# Patient Record
Sex: MALE | URBAN - METROPOLITAN AREA
[De-identification: names, ages, dates, MRNs, and addresses within clinical notes are randomized per-mention and may not be internally consistent; named-entity substitution may affect disease eponyms.]

---

## 2024-09-02 ENCOUNTER — HOSPITAL ENCOUNTER (EMERGENCY)
Facility: HOSPITAL | Age: 39
Discharge: HOME | End: 2024-09-03
Attending: EMERGENCY MEDICINE

## 2024-09-02 ENCOUNTER — APPOINTMENT (EMERGENCY)
Dept: CT IMAGING | Facility: HOSPITAL | Age: 39
End: 2024-09-02

## 2024-09-02 ENCOUNTER — APPOINTMENT (EMERGENCY)
Dept: RADIOLOGY | Facility: HOSPITAL | Age: 39
End: 2024-09-02

## 2024-09-02 VITALS
RESPIRATION RATE: 20 BRPM | SYSTOLIC BLOOD PRESSURE: 159 MMHG | OXYGEN SATURATION: 98 % | DIASTOLIC BLOOD PRESSURE: 96 MMHG | WEIGHT: 235.9 LBS | TEMPERATURE: 98.5 F | HEART RATE: 86 BPM

## 2024-09-02 DIAGNOSIS — M54.2 NECK PAIN: ICD-10-CM

## 2024-09-02 DIAGNOSIS — L03.90 CELLULITIS: ICD-10-CM

## 2024-09-02 DIAGNOSIS — R07.9 CHEST PAIN: Primary | ICD-10-CM

## 2024-09-02 LAB
ALBUMIN SERPL BCG-MCNC: 4.5 G/DL (ref 3.5–5)
ALP SERPL-CCNC: 88 U/L (ref 34–104)
ALT SERPL W P-5'-P-CCNC: 55 U/L (ref 7–52)
ANION GAP SERPL CALCULATED.3IONS-SCNC: 8 MMOL/L (ref 4–13)
AST SERPL W P-5'-P-CCNC: 30 U/L (ref 13–39)
BASOPHILS # BLD AUTO: 0.04 THOUSANDS/ÂΜL (ref 0–0.1)
BASOPHILS NFR BLD AUTO: 1 % (ref 0–1)
BILIRUB SERPL-MCNC: 0.5 MG/DL (ref 0.2–1)
BNP SERPL-MCNC: 27 PG/ML (ref 0–100)
BUN SERPL-MCNC: 14 MG/DL (ref 5–25)
CALCIUM SERPL-MCNC: 9.3 MG/DL (ref 8.4–10.2)
CARDIAC TROPONIN I PNL SERPL HS: <2 NG/L
CHLORIDE SERPL-SCNC: 98 MMOL/L (ref 96–108)
CO2 SERPL-SCNC: 29 MMOL/L (ref 21–32)
CREAT SERPL-MCNC: 1.05 MG/DL (ref 0.6–1.3)
EOSINOPHIL # BLD AUTO: 0.12 THOUSAND/ÂΜL (ref 0–0.61)
EOSINOPHIL NFR BLD AUTO: 1 % (ref 0–6)
ERYTHROCYTE [DISTWIDTH] IN BLOOD BY AUTOMATED COUNT: 12.4 % (ref 11.6–15.1)
GFR SERPL CREATININE-BSD FRML MDRD: 88 ML/MIN/1.73SQ M
GLUCOSE SERPL-MCNC: 116 MG/DL (ref 65–140)
HCT VFR BLD AUTO: 42.9 % (ref 36.5–49.3)
HGB BLD-MCNC: 14.5 G/DL (ref 12–17)
IMM GRANULOCYTES # BLD AUTO: 0.06 THOUSAND/UL (ref 0–0.2)
IMM GRANULOCYTES NFR BLD AUTO: 1 % (ref 0–2)
LYMPHOCYTES # BLD AUTO: 2.85 THOUSANDS/ÂΜL (ref 0.6–4.47)
LYMPHOCYTES NFR BLD AUTO: 33 % (ref 14–44)
MCH RBC QN AUTO: 31.5 PG (ref 26.8–34.3)
MCHC RBC AUTO-ENTMCNC: 33.8 G/DL (ref 31.4–37.4)
MCV RBC AUTO: 93 FL (ref 82–98)
MONOCYTES # BLD AUTO: 1.01 THOUSAND/ÂΜL (ref 0.17–1.22)
MONOCYTES NFR BLD AUTO: 12 % (ref 4–12)
NEUTROPHILS # BLD AUTO: 4.47 THOUSANDS/ÂΜL (ref 1.85–7.62)
NEUTS SEG NFR BLD AUTO: 52 % (ref 43–75)
NRBC BLD AUTO-RTO: 0 /100 WBCS
PLATELET # BLD AUTO: 365 THOUSANDS/UL (ref 149–390)
PMV BLD AUTO: 8.9 FL (ref 8.9–12.7)
POTASSIUM SERPL-SCNC: 3.8 MMOL/L (ref 3.5–5.3)
PROT SERPL-MCNC: 8.1 G/DL (ref 6.4–8.4)
RBC # BLD AUTO: 4.61 MILLION/UL (ref 3.88–5.62)
SODIUM SERPL-SCNC: 135 MMOL/L (ref 135–147)
WBC # BLD AUTO: 8.55 THOUSAND/UL (ref 4.31–10.16)

## 2024-09-02 PROCEDURE — 85025 COMPLETE CBC W/AUTO DIFF WBC: CPT | Performed by: EMERGENCY MEDICINE

## 2024-09-02 PROCEDURE — 70498 CT ANGIOGRAPHY NECK: CPT

## 2024-09-02 PROCEDURE — 71045 X-RAY EXAM CHEST 1 VIEW: CPT

## 2024-09-02 PROCEDURE — 80053 COMPREHEN METABOLIC PANEL: CPT | Performed by: EMERGENCY MEDICINE

## 2024-09-02 PROCEDURE — 36415 COLL VENOUS BLD VENIPUNCTURE: CPT | Performed by: EMERGENCY MEDICINE

## 2024-09-02 PROCEDURE — 93005 ELECTROCARDIOGRAM TRACING: CPT

## 2024-09-02 PROCEDURE — 96365 THER/PROPH/DIAG IV INF INIT: CPT

## 2024-09-02 PROCEDURE — 84484 ASSAY OF TROPONIN QUANT: CPT | Performed by: EMERGENCY MEDICINE

## 2024-09-02 PROCEDURE — 99285 EMERGENCY DEPT VISIT HI MDM: CPT | Performed by: EMERGENCY MEDICINE

## 2024-09-02 PROCEDURE — 96375 TX/PRO/DX INJ NEW DRUG ADDON: CPT

## 2024-09-02 PROCEDURE — 70496 CT ANGIOGRAPHY HEAD: CPT

## 2024-09-02 PROCEDURE — 96361 HYDRATE IV INFUSION ADD-ON: CPT

## 2024-09-02 PROCEDURE — 99285 EMERGENCY DEPT VISIT HI MDM: CPT

## 2024-09-02 PROCEDURE — 83880 ASSAY OF NATRIURETIC PEPTIDE: CPT | Performed by: EMERGENCY MEDICINE

## 2024-09-02 RX ORDER — ACETAMINOPHEN 10 MG/ML
1000 INJECTION, SOLUTION INTRAVENOUS ONCE
Status: COMPLETED | OUTPATIENT
Start: 2024-09-02 | End: 2024-09-02

## 2024-09-02 RX ORDER — KETOROLAC TROMETHAMINE 30 MG/ML
15 INJECTION, SOLUTION INTRAMUSCULAR; INTRAVENOUS ONCE
Status: COMPLETED | OUTPATIENT
Start: 2024-09-02 | End: 2024-09-02

## 2024-09-02 RX ADMIN — SODIUM CHLORIDE 1000 ML: 0.9 INJECTION, SOLUTION INTRAVENOUS at 21:42

## 2024-09-02 RX ADMIN — IOHEXOL 100 ML: 350 INJECTION, SOLUTION INTRAVENOUS at 23:50

## 2024-09-02 RX ADMIN — KETOROLAC TROMETHAMINE 15 MG: 30 INJECTION, SOLUTION INTRAMUSCULAR; INTRAVENOUS at 21:42

## 2024-09-02 RX ADMIN — ACETAMINOPHEN 1000 MG: 10 INJECTION INTRAVENOUS at 21:42

## 2024-09-02 NOTE — Clinical Note
Phil Caban was seen and treated in our emergency department on 9/2/2024.                Diagnosis:     Phil  .    He may return on this date: 09/06/2024         If you have any questions or concerns, please don't hesitate to call.      Kishore Johnson, DO    ______________________________           _______________          _______________  Hospital Representative                              Date                                Time

## 2024-09-03 LAB
ATRIAL RATE: 86 BPM
P AXIS: 45 DEGREES
PR INTERVAL: 170 MS
QRS AXIS: 37 DEGREES
QRSD INTERVAL: 92 MS
QT INTERVAL: 348 MS
QTC INTERVAL: 416 MS
T WAVE AXIS: 31 DEGREES
VENTRICULAR RATE: 86 BPM

## 2024-09-03 PROCEDURE — 93010 ELECTROCARDIOGRAM REPORT: CPT | Performed by: INTERNAL MEDICINE

## 2024-09-03 PROCEDURE — 96361 HYDRATE IV INFUSION ADD-ON: CPT

## 2024-09-03 RX ORDER — CEPHALEXIN 500 MG/1
500 CAPSULE ORAL EVERY 12 HOURS SCHEDULED
Qty: 14 CAPSULE | Refills: 0 | Status: SHIPPED | OUTPATIENT
Start: 2024-09-03 | End: 2024-09-10

## 2024-09-03 NOTE — ED PROVIDER NOTES
History  Chief Complaint   Patient presents with    Chest Pain     Pt c/o ongoing L sided chest pain x 2 weeks ago; pt also c/o L sided neck pain possible enlarged lymph node     9-year-old male, English-speaking, offered  service and prefers to use family.  Patient recently emigrated from [facility].  Has no PCP or medical care country yet.  Does note he has a history of a dilated cardiomyopathy, although cannot provide any additional insight into medical conditions.  Patient has had 2 weeks of left-sided chest pain, nonradiating, generalized bodyaches.  Also complaining of left-sided neck swelling and pain with neck movement.  He states he feels like the pain goes up into the left side of his head.  No vision changes no focal weakness numbness or tingling anywhere in his body, no history of stroke, CVA or thromboembolism that the patient is aware of.      Chest Pain  Associated symptoms: no abdominal pain, no cough, no dizziness, no dysphagia, no fever, no nausea, no numbness, no palpitations, no shortness of breath, not vomiting and no weakness        None       Past Medical History:   Diagnosis Date    Tachycardia        No past surgical history on file.    No family history on file.  I have reviewed and agree with the history as documented.    E-Cigarette/Vaping     E-Cigarette/Vaping Substances          Review of Systems   Constitutional: Negative.  Negative for chills and fever.   HENT: Negative.  Negative for rhinorrhea, sore throat, trouble swallowing and voice change.    Eyes: Negative.  Negative for pain and visual disturbance.   Respiratory: Negative.  Negative for cough, shortness of breath and wheezing.    Cardiovascular:  Positive for chest pain. Negative for palpitations.   Gastrointestinal:  Negative for abdominal pain, diarrhea, nausea and vomiting.   Genitourinary: Negative.  Negative for dysuria and frequency.   Musculoskeletal:  Positive for arthralgias and neck pain. Negative for neck  stiffness.   Skin: Negative.  Negative for rash.   Neurological: Negative.  Negative for dizziness, speech difficulty, weakness, light-headedness and numbness.       Physical Exam  Physical Exam  Vitals and nursing note reviewed.   Constitutional:       General: He is not in acute distress.     Appearance: He is well-developed.   HENT:      Head: Normocephalic and atraumatic.   Eyes:      Conjunctiva/sclera: Conjunctivae normal.      Pupils: Pupils are equal, round, and reactive to light.   Neck:      Trachea: No tracheal deviation.     Cardiovascular:      Rate and Rhythm: Normal rate and regular rhythm.   Pulmonary:      Effort: Pulmonary effort is normal. No respiratory distress.      Breath sounds: Normal breath sounds. No wheezing or rales.   Abdominal:      General: Bowel sounds are normal. There is no distension.      Palpations: Abdomen is soft.      Tenderness: There is no abdominal tenderness. There is no guarding or rebound.   Musculoskeletal:         General: No tenderness or deformity. Normal range of motion.      Cervical back: Normal range of motion and neck supple.   Skin:     General: Skin is warm and dry.      Capillary Refill: Capillary refill takes less than 2 seconds.      Findings: No rash.   Neurological:      Mental Status: He is alert and oriented to person, place, and time.   Psychiatric:         Behavior: Behavior normal.         Vital Signs  ED Triage Vitals   Temperature Pulse Respirations Blood Pressure SpO2   09/02/24 2111 09/02/24 2108 09/02/24 2108 09/02/24 2111 09/02/24 2111   98.5 °F (36.9 °C) 86 20 159/96 98 %      Temp Source Heart Rate Source Patient Position - Orthostatic VS BP Location FiO2 (%)   09/02/24 2111 -- 09/02/24 2111 09/02/24 2111 --   Oral  Sitting Right arm       Pain Score       09/02/24 2142       6           Vitals:    09/02/24 2108 09/02/24 2111   BP:  159/96   Pulse: 86    Patient Position - Orthostatic VS:  Sitting         Visual Acuity      ED  Medications  Medications   sodium chloride 0.9 % bolus 1,000 mL (0 mL Intravenous Stopped 9/3/24 0058)   ketorolac (TORADOL) injection 15 mg (15 mg Intravenous Given 9/2/24 2142)   acetaminophen (Ofirmev) injection 1,000 mg (0 mg Intravenous Stopped 9/2/24 2219)   iohexol (OMNIPAQUE) 350 MG/ML injection (SINGLE-DOSE) 100 mL (100 mL Intravenous Given 9/2/24 2350)       Diagnostic Studies  Results Reviewed       Procedure Component Value Units Date/Time    HS Troponin 0hr (reflex protocol) [[id number]]  (Normal) Collected: 09/02/24 2142    Lab Status: Final result Specimen: Blood from Arm, Left Updated: 09/02/24 2212     hs TnI 0hr <2 ng/L     B-Type Natriuretic Peptide(BNP) [[id number]]  (Normal) Collected: 09/02/24 2142    Lab Status: Final result Specimen: Blood from Arm, Left Updated: 09/02/24 2210     BNP 27 pg/mL     Comprehensive metabolic panel [[id number]]  (Abnormal) Collected: 09/02/24 2142    Lab Status: Final result Specimen: Blood from Arm, Left Updated: 09/02/24 2206     Sodium 135 mmol/L      Potassium 3.8 mmol/L      Chloride 98 mmol/L      CO2 29 mmol/L      ANION GAP 8 mmol/L      BUN 14 mg/dL      Creatinine 1.05 mg/dL      Glucose 116 mg/dL      Calcium 9.3 mg/dL      AST 30 U/L      ALT 55 U/L      Alkaline Phosphatase 88 U/L      Total Protein 8.1 g/dL      Albumin 4.5 g/dL      Total Bilirubin 0.50 mg/dL      eGFR 88 ml/min/1.73sq m     Narrative:      National Kidney Disease Foundation guidelines for Chronic Kidney Disease (CKD):     Stage 1 with normal or high GFR (GFR > 90 mL/min/1.73 square meters)    Stage 2 Mild CKD (GFR = 60-89 mL/min/1.73 square meters)    Stage 3A Moderate CKD (GFR = 45-59 mL/min/1.73 square meters)    Stage 3B Moderate CKD (GFR = 30-44 mL/min/1.73 square meters)    Stage 4 Severe CKD (GFR = 15-29 mL/min/1.73 square meters)    Stage 5 End Stage CKD (GFR <15 mL/min/1.73 square meters)  Note: GFR calculation is accurate only with a steady state creatinine    CBC and  differential [[id number]] Collected: 09/02/24 2142    Lab Status: Final result Specimen: Blood from Arm, Left Updated: 09/02/24 2147     WBC 8.55 Thousand/uL      RBC 4.61 Million/uL      Hemoglobin 14.5 g/dL      Hematocrit 42.9 %      MCV 93 fL      MCH 31.5 pg      MCHC 33.8 g/dL      RDW 12.4 %      MPV 8.9 fL      Platelets 365 Thousands/uL      nRBC 0 /100 WBCs      Segmented % 52 %      Immature Grans % 1 %      Lymphocytes % 33 %      Monocytes % 12 %      Eosinophils Relative 1 %      Basophils Relative 1 %      Absolute Neutrophils 4.47 Thousands/µL      Absolute Immature Grans 0.06 Thousand/uL      Absolute Lymphocytes 2.85 Thousands/µL      Absolute Monocytes 1.01 Thousand/µL      Eosinophils Absolute 0.12 Thousand/µL      Basophils Absolute 0.04 Thousands/µL                    CTA head and neck with and without contrast   Final Result by Herb Calderon DO (09/03 0045)      CT Brain:  No acute intracranial abnormality.      CT Angiography:  Unremarkable CTA neck and brain.                  Workstation performed: [id number]         XR chest 1 view portable   ED Interpretation by Kishore Johnson DO (09/02 2207)   No acute cardiopulmonary disease appreciated.      Final Result by Crystal Allen MD (09/03 0829)      No acute cardiopulmonary disease.            Workstation performed: [id number]                    Procedures  Procedures         ED Course  ED Course as of 09/03/24 1050   Mon Sep 02, 2024   2111 Procedure Note: EKG  Date/Time: 09/02/24 9:11 PM   Performed by: Abdelrahman Johnson  Authorized by: Abdelrahman Johnson  ECG interpreted by me, the ED Provider: yes   The EKG demonstrates:  Rate 86  Rhythm sinus  QTc 416  No ST elevations/depressions       2206 Hemoglobin: 14.5   2206 WBC: 8.55   2235 hs TnI 0hr: <2   2236 Chest pain present for 2 weeks. Trop undetectable. No inidcation for continues serial troponins.                HEART Risk Score      Flowsheet Row Most Recent Value   Heart Score Risk  Calculator    History 1 Filed at: 09/02/2024 2238   ECG 0 Filed at: 09/02/2024 2238   Age 0 Filed at: 09/02/2024 2238   Risk Factors 0 Filed at: 09/02/2024 2238   Troponin 0 Filed at: 09/02/2024 2238   HEART Score 1 Filed at: 09/02/2024 2238                                        Medical Decision Making  39-year-old male, presenting for concerns of neck pain and chest pain.  Chest pain is present for 2 weeks, EKG is normal sinus rhythm without evidence of ischemia per my interpretation.  He is also had a negative troponin test.  At this point acute myocardial infarction and ACS essentially has been ruled out.  Patient's blood work is otherwise unremarkable.  I did review his CT angiogram of his head and his neck.  No large extravasation or large mass appreciated.  Care is being transferred to Dr. Carol Rivas pending a read for his CTs.  If normal patient can be discharged with outpatient follow-up.    Amount and/or Complexity of Data Reviewed  Labs: ordered. Decision-making details documented in ED Course.  Radiology: ordered and independent interpretation performed.    Risk  Prescription drug management.                 Disposition  Final diagnoses:   Chest pain   Neck pain   Cellulitis     Time reflects when diagnosis was documented in both MDM as applicable and the Disposition within this note       Time User Action Codes Description Comment    9/3/2024 12:07 AM Kishore Johnson [R07.9] Chest pain     9/3/2024 12:07 AM Kishore Johnson [M54.2] Neck pain     9/3/2024 12:08 AM Kishore Johnson [L03.90] Cellulitis           ED Disposition       ED Disposition   Discharge    Condition   Stable    Date/Time   Tue Sep 3, 2024 0049    Comment   Phil Caban discharge to home/self care.                   Follow-up Information       Follow up With Specialties Details Why Contact Info Additional Information    [facility] In 1 week  [address],  [address]  [facility] [address] [phone] [facility], [address] [place] [address], [address], [place], [place], [address] [phone]            Discharge Medication List as of 9/3/2024 12:49 AM        START taking these medications    Details   cephalexin (KEFLEX) 500 mg capsule Take 1 capsule (500 mg total) by mouth every 12 (twelve) hours for 7 days, Starting Tue 9/3/2024, Until Tue 9/10/2024, Normal             No discharge procedures on file.    PDMP Review       None            ED Provider  Electronically Signed by             Kishore Johnson DO  09/03/24 8460

## 2024-09-03 NOTE — ED CARE HANDOFF
Emergency Department Sign Out Note        Sign out and transfer of care from Alex. See Separate Emergency Department note.     The patient, Phil Caban, was evaluated by the previous provider for see prior note.      ED Course / Workup Pending (followup):        HEART Risk Score      Flowsheet Row Most Recent Value   Heart Score Risk Calculator    History 1 Filed at: 09/02/2024 2238   ECG 0 Filed at: 09/02/2024 2238   Age 0 Filed at: 09/02/2024 2238   Risk Factors 0 Filed at: 09/02/2024 2238   Troponin 0 Filed at: 09/02/2024 2238   HEART Score 1 Filed at: 09/02/2024 2238                                    ED Course as of 09/03/24 0050   Tue Sep 03, 2024   0006 S/o: came in with chest pain, EKG nsr, trop negative, left neck redness. Pending CTA, if negative, outpt abx and pcp follow up.   0050      CT Brain:  No acute intracranial abnormality.     CT Angiography:  Unremarkable CTA neck and brain.          Procedures  Medical Decision Making  Amount and/or Complexity of Data Reviewed  Labs: ordered.  Radiology: ordered and independent interpretation performed.    Risk  Prescription drug management.            Disposition  Final diagnoses:   Chest pain   Neck pain   Cellulitis     Time reflects when diagnosis was documented in both MDM as applicable and the Disposition within this note       Time User Action Codes Description Comment    9/3/2024 12:07 AM Kishore Johnson [R07.9] Chest pain     9/3/2024 12:07 AM Kishore Johnson [M54.2] Neck pain     9/3/2024 12:08 AM Kishore Johnson [L03.90] Cellulitis           ED Disposition       ED Disposition   Discharge    Condition   Stable    Date/Time   Tue Sep 3, 2024 0049    Comment   Phil Caban discharge to home/self care.                   Follow-up Information       Follow up With Specialties Details Why Contact Info Additional Information    [facility] In 1 week  [address] [place] [address],  [address]  [facility] [address] [phone] [facility], [address] [place] [address], [address], [place], [place], [address] [phone]          Patient's Medications   Discharge Prescriptions    CEPHALEXIN (KEFLEX) 500 MG CAPSULE    Take 1 capsule (500 mg total) by mouth every 12 (twelve) hours for 7 days       Start Date: 9/3/2024  End Date: 9/10/2024       Order Dose: 500 mg       Quantity: 14 capsule    Refills: 0     No discharge procedures on file.       ED Provider  Electronically Signed by     Carol Rivas MD  09/03/24 0050

## 2024-10-09 ENCOUNTER — APPOINTMENT (EMERGENCY)
Dept: RADIOLOGY | Facility: HOSPITAL | Age: 39
End: 2024-10-09

## 2024-10-09 ENCOUNTER — HOSPITAL ENCOUNTER (EMERGENCY)
Facility: HOSPITAL | Age: 39
Discharge: HOME | End: 2024-10-09
Attending: STUDENT IN AN ORGANIZED HEALTH CARE EDUCATION/TRAINING PROGRAM

## 2024-10-09 VITALS
RESPIRATION RATE: 16 BRPM | SYSTOLIC BLOOD PRESSURE: 133 MMHG | TEMPERATURE: 98.3 F | OXYGEN SATURATION: 98 % | DIASTOLIC BLOOD PRESSURE: 86 MMHG | HEART RATE: 77 BPM | WEIGHT: 234.79 LBS

## 2024-10-09 DIAGNOSIS — R07.9 CHEST PAIN, UNSPECIFIED TYPE: Primary | ICD-10-CM

## 2024-10-09 DIAGNOSIS — B34.9 VIRAL SYNDROME: ICD-10-CM

## 2024-10-09 LAB
ALBUMIN SERPL BCG-MCNC: 4.2 G/DL (ref 3.5–5)
ALP SERPL-CCNC: 81 U/L (ref 34–104)
ALT SERPL W P-5'-P-CCNC: 43 U/L (ref 7–52)
ANION GAP SERPL CALCULATED.3IONS-SCNC: 6 MMOL/L (ref 4–13)
AST SERPL W P-5'-P-CCNC: 49 U/L (ref 13–39)
ATRIAL RATE: 80 BPM
BASOPHILS # BLD AUTO: 0.03 THOUSANDS/ΜL (ref 0–0.1)
BASOPHILS NFR BLD AUTO: 0 % (ref 0–1)
BILIRUB SERPL-MCNC: 0.52 MG/DL (ref 0.2–1)
BUN SERPL-MCNC: 14 MG/DL (ref 5–25)
CALCIUM SERPL-MCNC: 8.9 MG/DL (ref 8.4–10.2)
CARDIAC TROPONIN I PNL SERPL HS: <2 NG/L
CARDIAC TROPONIN I PNL SERPL HS: <2 NG/L
CHLORIDE SERPL-SCNC: 102 MMOL/L (ref 96–108)
CO2 SERPL-SCNC: 29 MMOL/L (ref 21–32)
CREAT SERPL-MCNC: 1.02 MG/DL (ref 0.6–1.3)
D DIMER PPP FEU-MCNC: 0.39 UG/ML FEU
EOSINOPHIL # BLD AUTO: 0.13 THOUSAND/ΜL (ref 0–0.61)
EOSINOPHIL NFR BLD AUTO: 2 % (ref 0–6)
ERYTHROCYTE [DISTWIDTH] IN BLOOD BY AUTOMATED COUNT: 12.3 % (ref 11.6–15.1)
GFR SERPL CREATININE-BSD FRML MDRD: 92 ML/MIN/1.73SQ M
GLUCOSE SERPL-MCNC: 105 MG/DL (ref 65–140)
HCT VFR BLD AUTO: 38.9 % (ref 36.5–49.3)
HGB BLD-MCNC: 13 G/DL (ref 12–17)
IMM GRANULOCYTES # BLD AUTO: 0.03 THOUSAND/UL (ref 0–0.2)
IMM GRANULOCYTES NFR BLD AUTO: 0 % (ref 0–2)
LYMPHOCYTES # BLD AUTO: 1.71 THOUSANDS/ΜL (ref 0.6–4.47)
LYMPHOCYTES NFR BLD AUTO: 25 % (ref 14–44)
MCH RBC QN AUTO: 31 PG (ref 26.8–34.3)
MCHC RBC AUTO-ENTMCNC: 33.4 G/DL (ref 31.4–37.4)
MCV RBC AUTO: 93 FL (ref 82–98)
MONOCYTES # BLD AUTO: 0.95 THOUSAND/ΜL (ref 0.17–1.22)
MONOCYTES NFR BLD AUTO: 14 % (ref 4–12)
NEUTROPHILS # BLD AUTO: 3.95 THOUSANDS/ΜL (ref 1.85–7.62)
NEUTS SEG NFR BLD AUTO: 59 % (ref 43–75)
NRBC BLD AUTO-RTO: 0 /100 WBCS
P AXIS: 28 DEGREES
PLATELET # BLD AUTO: 304 THOUSANDS/UL (ref 149–390)
PMV BLD AUTO: 9 FL (ref 8.9–12.7)
POTASSIUM SERPL-SCNC: 3.6 MMOL/L (ref 3.5–5.3)
PR INTERVAL: 162 MS
PROT SERPL-MCNC: 7.4 G/DL (ref 6.4–8.4)
QRS AXIS: 47 DEGREES
QRSD INTERVAL: 90 MS
QT INTERVAL: 362 MS
QTC INTERVAL: 417 MS
RBC # BLD AUTO: 4.2 MILLION/UL (ref 3.88–5.62)
SODIUM SERPL-SCNC: 137 MMOL/L (ref 135–147)
T WAVE AXIS: 30 DEGREES
VENTRICULAR RATE: 80 BPM
WBC # BLD AUTO: 6.8 THOUSAND/UL (ref 4.31–10.16)

## 2024-10-09 PROCEDURE — 80053 COMPREHEN METABOLIC PANEL: CPT | Performed by: STUDENT IN AN ORGANIZED HEALTH CARE EDUCATION/TRAINING PROGRAM

## 2024-10-09 PROCEDURE — 96365 THER/PROPH/DIAG IV INF INIT: CPT

## 2024-10-09 PROCEDURE — 85025 COMPLETE CBC W/AUTO DIFF WBC: CPT | Performed by: STUDENT IN AN ORGANIZED HEALTH CARE EDUCATION/TRAINING PROGRAM

## 2024-10-09 PROCEDURE — 36415 COLL VENOUS BLD VENIPUNCTURE: CPT

## 2024-10-09 PROCEDURE — 85379 FIBRIN DEGRADATION QUANT: CPT | Performed by: STUDENT IN AN ORGANIZED HEALTH CARE EDUCATION/TRAINING PROGRAM

## 2024-10-09 PROCEDURE — 71045 X-RAY EXAM CHEST 1 VIEW: CPT

## 2024-10-09 PROCEDURE — 93005 ELECTROCARDIOGRAM TRACING: CPT

## 2024-10-09 PROCEDURE — 96361 HYDRATE IV INFUSION ADD-ON: CPT

## 2024-10-09 PROCEDURE — 99285 EMERGENCY DEPT VISIT HI MDM: CPT

## 2024-10-09 PROCEDURE — 93010 ELECTROCARDIOGRAM REPORT: CPT | Performed by: STUDENT IN AN ORGANIZED HEALTH CARE EDUCATION/TRAINING PROGRAM

## 2024-10-09 PROCEDURE — 84484 ASSAY OF TROPONIN QUANT: CPT | Performed by: STUDENT IN AN ORGANIZED HEALTH CARE EDUCATION/TRAINING PROGRAM

## 2024-10-09 RX ORDER — METHOCARBAMOL 500 MG/1
500 TABLET, FILM COATED ORAL ONCE
Status: COMPLETED | OUTPATIENT
Start: 2024-10-09 | End: 2024-10-09

## 2024-10-09 RX ORDER — IBUPROFEN 400 MG/1
400 TABLET, FILM COATED ORAL ONCE
Status: COMPLETED | OUTPATIENT
Start: 2024-10-09 | End: 2024-10-09

## 2024-10-09 RX ORDER — ACETAMINOPHEN 10 MG/ML
1000 INJECTION, SOLUTION INTRAVENOUS ONCE
Status: COMPLETED | OUTPATIENT
Start: 2024-10-09 | End: 2024-10-09

## 2024-10-09 RX ADMIN — SODIUM CHLORIDE 1000 ML: 0.9 INJECTION, SOLUTION INTRAVENOUS at 15:31

## 2024-10-09 RX ADMIN — IBUPROFEN 400 MG: 400 TABLET, FILM COATED ORAL at 17:45

## 2024-10-09 RX ADMIN — ACETAMINOPHEN 1000 MG: 10 INJECTION INTRAVENOUS at 15:32

## 2024-10-09 RX ADMIN — METHOCARBAMOL 500 MG: 500 TABLET ORAL at 17:45

## 2024-10-09 NOTE — ED ATTENDING ATTESTATION
10/09/24    I, Sherie Barbosa MD, saw and evaluated the patient. I have discussed the patient with the resident/non-physician practitioner and agree with the resident's/non-physician practitioner's findings, Plan of Care, and MDM as documented in the resident's/non-physician practitioner's note, except where noted. All available labs and Radiology studies were reviewed.  I was present for key portions of any procedure(s) performed by the resident/non-physician practitioner and I was immediately available to provide assistance.       At this point I agree with the current assessment done in the Emergency Department.  I have conducted an independent evaluation of this patient a history and physical is as follows:    Subjective: 39-year-old male with no past medical history presenting with left-sided chest pain radiating to left neck and left axilla in the setting of recent respiratory illness with cough productive of greenish sputum and myalgias including bilateral lower extremity pain.  He also reports red macular rash to bilateral lower and upper extremities.    Objective: Ill but nontoxic-appearing young man in no acute distress on room air with stable vitals and afebrile here.  Cardiopulmonary exam benign.  Erythematous, blanchable macules on the bilateral proximal thighs that are tender to palpation subjectively.  Homans' sign positive bilaterally but no lower extremity edema present.    Assessment/Plan: Previously healthy young man presenting after extended travel from [place] with respiratory illness and chest pain.  Differential includes but is not limited to ACS, PE, pneumonia, tuberculosis.  He also has a tender, erythematous blanchable macules to bilateral lower extremities but also present on upper extremities that could be consistent with erythema nodosum which is a nonspecific finding.  D-dimer within normal limits, EKG without ST elevation/depressions.  Chest x-ray with no acute abnormality.  Heart score  1.    Dispo: Patient was discharged to home with strict return precautions. Patient acknowledged understanding of plan and diagnostic results, and all their questions were answered to their satisfaction.         ED Course  ED Course as of 10/09/24 1653   Wed Oct 09, 2024   1632 D-Dimer, Quant: 0.39         Critical Care Time  Procedures

## 2024-10-09 NOTE — DISCHARGE INSTRUCTIONS
Lo atendieron en el Departamento de Emergencias por: dolor en el pecho y tos    La examen de brain mostró: no hay evidencia de ataque cardíaco, coágulo de shiloh o neumonía bacteriana    Lisa próximos pasos deben incluir: llame brain para hacer delonte george con la proveedor de atención primaria en delonte semana.     Razones para REGRESAR INMEDIATAMENTE al Departamento de Emergencias: empeoramiento de los síntomas, dificultad para respirar, temperatura > 100.4 grados, náuseas/vómitos incontrolables o cualquier otra inquietud.    -----------------------------------------    You were seen in the Emergency Department for: chest pain and cough    Your workup today showed: no evidence of heart attack, blood clot, or bacterial pneumonia    Your next steps should include: call today to make an appointment with your primary care provider in one week.     Reasons to RETURN IMMEDIATELY to the Emergency Department: worsening symptoms, difficulty breathing, temperature > 100.4 degrees, uncontrollable nausea/vomiting, or any other concerns.

## 2024-10-09 NOTE — ED PROVIDER NOTES
Final diagnoses:   Chest pain, unspecified type   Viral syndrome     ED Disposition       ED Disposition   Discharge    Condition   Stable    Date/Time   Wed Oct 9, 2024  5:45 PM    Comment   Phil Caban discharge to home/self care.                   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  The chest pain radiates up the left side of the neck and left shoulder.  Patient was skinning clothing when it started.  Patient also noticed to nonpainful erythremic nodules that appeared bilateral thighs.  Patient has been having shortness of breath and a productive dark green-colored sputum for the last 3 days.  Patient does not know his own vaccination record.  Patient recently moved to [place] 2 months ago.  Patient's wife and family had similar upper respiratory symptoms.     Patient seen and examined noted to have congestion, unremarkable physical exam.      Differential diagnosis includes but is not limited to ACS, pulmonary embolism, electrolyte abnormality, viral upper respiratory infection, viral syndrome.      Patient's labs notable for: Unremarkable troponin x 2, D-dimer, CMP, CBC, Imaging revealed: No acute cardiopulmonary normalities present, and EKG: interpreted by myself as above.    Heart Score: 2, PERC: 0, and Well's Score: 0     Patient appears well, is nontoxic appearing, Phil Caban expresses understanding and agrees with plan of care at this time.  In light of this patient would benefit from outpatient management.    Patient was rx'd as below       Amount and/or Complexity of Data Reviewed  Labs: ordered.  Radiology: ordered.    Risk  Prescription drug management.             Medications   acetaminophen (Ofirmev) injection 1,000 mg (0 mg Intravenous Stopped 10/9/24 1626)   sodium chloride 0.9 % bolus 1,000 mL (0 mL Intravenous Stopped 10/9/24 1712)   methocarbamol (ROBAXIN) tablet 500 mg  (500 mg Oral Given 10/9/24 1745)   ibuprofen (MOTRIN) tablet 400 mg (400 mg Oral Given 10/9/24 1745)       ED Risk Strat Scores   HEART Risk Score      Flowsheet Row Most Recent Value   Heart Score Risk Calculator    History 1 Filed at: 10/09/2024 2120   ECG 0 Filed at: 10/09/2024 2120   Age 0 Filed at: 10/09/2024 2120   Risk Factors 1 Filed at: 10/09/2024 2120   Troponin 0 Filed at: 10/09/2024 2120   HEART Score 2 Filed at: 10/09/2024 2120                           PERC Rule for PE      Flowsheet Row Most Recent Value   PERC Rule for PE    Age >=50 0 Filed at: 10/09/2024 2119   HR >=100 0 Filed at: 10/09/2024 2119   O2 Sat on room air < 95% 0 Filed at: 10/09/2024 2119   History of PE or DVT 0 Filed at: 10/09/2024 2119   Recent trauma or surgery 0 Filed at: 10/09/2024 2119   Hemoptysis 0 Filed at: 10/09/2024 2119   Exogenous estrogen 0 Filed at: 10/09/2024 2119   Unilateral leg swelling 0 Filed at: 10/09/2024 2119   PERC Rule for PE Results 0 Filed at: 10/09/2024 2119            SBIRT 22yo+      Flowsheet Row Most Recent Value   Initial Alcohol Screen: US AUDIT-C     1. How often do you have a drink containing alcohol? 0 Filed at: 10/09/2024 1416   2. How many drinks containing alcohol do you have on a typical day you are drinking?  0 Filed at: 10/09/2024 1416   3a. Male UNDER 65: How often do you have five or more drinks on one occasion? 0 Filed at: 10/09/2024 1416   3b. FEMALE Any Age, or MALE 65+: How often do you have 4 or more drinks on one occassion? 0 Filed at: 10/09/2024 1416   Audit-C Score 0 Filed at: 10/09/2024 1416   [place]: How many times in the past year have you...    Used an illegal drug or used a prescription medication for non-medical reasons? Never Filed at: 10/09/2024 1416            Wells' Criteria for PE      Flowsheet Row Most Recent Value   Wells' Criteria for PE    Clinical signs and symptoms of DVT 0 Filed at: 10/09/2024 2119   PE is primary diagnosis or equally likely 0 Filed at: 10/09/2024  2119   HR >100 0 Filed at: 10/09/2024 2119   Immobilization at least 3 days or Surgery in the previous 4 weeks 0 Filed at: 10/09/2024 2119   Previous, objectively diagnosed PE or DVT 0 Filed at: 10/09/2024 2119   Hemoptysis 0 Filed at: 10/09/2024 2119   Malignancy with treatment within 6 months or palliative 0 Filed at: 10/09/2024 2119   Wells' Criteria Total 0 Filed at: 10/09/2024 2119                        History of Present Illness       Chief Complaint   Patient presents with    Chest Pain     Arrives via EMS with c/o CP that started around 1300 today, feels sharp, radiates into left neck. Reports feeling SOB, but states that he has a cold, so is unsure if related. Reports feeling dehydrated and dizziness. States that urine is dark.        Past Medical History:   Diagnosis Date    Tachycardia       History reviewed. No pertinent surgical history.   History reviewed. No pertinent family history.   Social History     Tobacco Use    Smoking status: Never    Smokeless tobacco: Never   Substance Use Topics    Alcohol use: Not Currently    Drug use: Never      E-Cigarette/Vaping      E-Cigarette/Vaping Substances      I have reviewed and agree with the history as documented.     Phil Caban is a 39 y.o. male     They presented to the emergency department on October 9, 2024. 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  The chest pain radiates up the left side of the neck and left shoulder.  Patient was skinning clothing when it started.  Patient also noticed to nonpainful erythremic nodules that appeared bilateral thighs.  Patient has been having shortness of breath and a productive dark green-colored sputum for the last 3 days.  Patient does not know his own vaccination record.  Patient recently moved to [place] 2 months ago.  Patient's wife and family had similar upper respiratory symptoms.  Patient had similar chest pain last month which was ruled to be  musculoskeletal in nature.  He has not taken any medications for symptoms.  Denies any allergies.  Denies abdominal pain, nausea, vomiting, dysuria, polyuria, hematuria, constipation, diarrhea, or any other complaint at this time.            Review of Systems   Constitutional:  Negative for chills and fever.   HENT:  Negative for ear pain and sore throat.    Eyes:  Negative for pain and visual disturbance.   Respiratory:  Positive for cough and shortness of breath.    Cardiovascular:  Positive for chest pain. Negative for palpitations.   Gastrointestinal:  Negative for abdominal pain, constipation, diarrhea, nausea and vomiting.   Genitourinary:  Negative for dysuria and hematuria.   Musculoskeletal:  Negative for arthralgias and back pain.   Skin:  Negative for color change and rash.   Neurological:  Negative for seizures and syncope.   All other systems reviewed and are negative.          Objective       ED Triage Vitals   Temperature Pulse Blood Pressure Respirations SpO2 Patient Position - Orthostatic VS   10/09/24 1414 10/09/24 1414 10/09/24 1414 10/09/24 1414 10/09/24 1414 10/09/24 1414   98.3 °F (36.8 °C) 77 133/86 16 98 % Lying      Temp Source Heart Rate Source BP Location FiO2 (%) Pain Score    10/09/24 1414 10/09/24 1414 10/09/24 1414 -- 10/09/24 1745    Oral Monitor Right arm  9      Vitals      Date and Time Temp Pulse SpO2 Resp BP Pain Score FACES Pain Rating User   10/09/24 1745 -- -- -- -- -- 9 -- LR   10/09/24 1414 98.3 °F (36.8 °C) 77 98 % 16 133/86 -- -- LR            Physical Exam  Vitals and nursing note reviewed.   Constitutional:       General: He is not in acute distress.     Appearance: He is well-developed.   HENT:      Head: Normocephalic and atraumatic.   Eyes:      Conjunctiva/sclera: Conjunctivae normal.   Cardiovascular:      Rate and Rhythm: Normal rate and regular rhythm.      Pulses: Normal pulses.      Heart sounds: Normal heart sounds. Heart sounds not distant. No murmur heard.      No systolic murmur is present.      No diastolic murmur is present.      No friction rub. No gallop.   Pulmonary:      Effort: Pulmonary effort is normal. No respiratory distress.      Breath sounds: Normal breath sounds. No stridor. No decreased breath sounds, wheezing, rhonchi or rales.   Chest:      Chest wall: No tenderness.   Abdominal:      General: There is no distension.      Palpations: Abdomen is soft.      Tenderness: There is no abdominal tenderness. There is no guarding.      Hernia: No hernia is present.   Musculoskeletal:         General: No swelling.      Cervical back: Neck supple.   Skin:     General: Skin is warm and dry.      Capillary Refill: Capillary refill takes less than 2 seconds.   Neurological:      Mental Status: He is alert.      Comments: A&Ox4. Face symmetric, Tongue midline. CN II-XII intact. 5/5 strength in the bilateral upper and lower extremities with intact sensation to light touch throughout. Normal Finger-to-nose, Heel-to-shin, and Rapid alternating movements bilaterally. No pronator drift. Normal speech. Normal gait.   Psychiatric:         Mood and Affect: Mood normal.         Results Reviewed       Procedure Component Value Units Date/Time    HS Troponin I 2hr [[id number]] Collected: 10/09/24 1629    Lab Status: Final result Specimen: Blood from Arm, Left Updated: 10/09/24 1659     hs TnI 2hr <2 ng/L      Delta 2hr hsTnI --    D-Dimer [[id number]]  (Normal) Collected: 10/09/24 1425    Lab Status: Final result Specimen: Blood from Arm, Left Updated: 10/09/24 1627     D-Dimer, Quant 0.39 ug/ml FEU     HS Troponin 0hr (reflex protocol) [[id number]]  (Normal) Collected: 10/09/24 1425    Lab Status: Final result Specimen: Blood from Arm, Left Updated: 10/09/24 1456     hs TnI 0hr <2 ng/L     Comprehensive metabolic panel [[id number]]  (Abnormal) Collected: 10/09/24 1425    Lab Status: Final result Specimen: Blood from Arm, Left Updated: 10/09/24 1452     Sodium 137 mmol/L       Potassium 3.6 mmol/L      Chloride 102 mmol/L      CO2 29 mmol/L      ANION GAP 6 mmol/L      BUN 14 mg/dL      Creatinine 1.02 mg/dL      Glucose 105 mg/dL      Calcium 8.9 mg/dL      AST 49 U/L      ALT 43 U/L      Alkaline Phosphatase 81 U/L      Total Protein 7.4 g/dL      Albumin 4.2 g/dL      Total Bilirubin 0.52 mg/dL      eGFR 92 ml/min/1.73sq m     Narrative:      [employer] guidelines for Chronic Kidney Disease (CKD):     Stage 1 with normal or high GFR (GFR > 90 mL/min/1.73 square meters)    Stage 2 Mild CKD (GFR = 60-89 mL/min/1.73 square meters)    Stage 3A Moderate CKD (GFR = 45-59 mL/min/1.73 square meters)    Stage 3B Moderate CKD (GFR = 30-44 mL/min/1.73 square meters)    Stage 4 Severe CKD (GFR = 15-29 mL/min/1.73 square meters)    Stage 5 End Stage CKD (GFR <15 mL/min/1.73 square meters)  Note: GFR calculation is accurate only with a steady state creatinine    CBC and differential [[id number]]  (Abnormal) Collected: 10/09/24 1425    Lab Status: Final result Specimen: Blood from Arm, Left Updated: 10/09/24 1435     WBC 6.80 Thousand/uL      RBC 4.20 Million/uL      Hemoglobin 13.0 g/dL      Hematocrit 38.9 %      MCV 93 fL      MCH 31.0 pg      MCHC 33.4 g/dL      RDW 12.3 %      MPV 9.0 fL      Platelets 304 Thousands/uL      nRBC 0 /100 WBCs      Segmented % 59 %      Immature Grans % 0 %      Lymphocytes % 25 %      Monocytes % 14 %      Eosinophils Relative 2 %      Basophils Relative 0 %      Absolute Neutrophils 3.95 Thousands/µL      Absolute Immature Grans 0.03 Thousand/uL      Absolute Lymphocytes 1.71 Thousands/µL      Absolute Monocytes 0.95 Thousand/µL      Eosinophils Absolute 0.13 Thousand/µL      Basophils Absolute 0.03 Thousands/µL             XR chest 1 view portable   Final Interpretation by Crystal Allen MD (10/09 1558)      No acute cardiopulmonary disease.            Workstation performed: [id number]             ECG 12 Lead Documentation Only    Date/Time:  10/9/2024 9:16 PM    Performed by: Lobito Treviño MD  Authorized by: Lobito Treviño MD    Indications / Diagnosis:  Chest pain  ECG reviewed by me, the ED Provider: no    Patient location:  ED  Previous ECG:     Previous ECG:  Compared to current    Comparison ECG info:  02 sep-2024 21:07:08    Similarity:  No change    Comparison to cardiac monitor: No    Interpretation:     Interpretation: normal    Rate:     ECG rate:  80    ECG rate assessment: normal    Rhythm:     Rhythm: sinus rhythm    Ectopy:     Ectopy: none    QRS:     QRS axis:  Normal    QRS intervals:  Normal  Conduction:     Conduction: normal    ST segments:     ST segments:  Normal  T waves:     T waves: normal        ED Medication and Procedure Management   None     There are no discharge medications for this patient.      ED SEPSIS DOCUMENTATION   Time reflects when diagnosis was documented in both MDM as applicable and the Disposition within this note       Time User Action Codes Description Comment    10/9/2024  5:07 PM Sherie Barbosa Add [R07.9] Chest pain, unspecified type     10/9/2024  5:07 PM Sherie Barbosa Add [B34.9] Viral syndrome                  Lobito Treviño MD  10/09/24 2124